# Patient Record
Sex: FEMALE | Race: WHITE | NOT HISPANIC OR LATINO | Employment: OTHER | URBAN - METROPOLITAN AREA
[De-identification: names, ages, dates, MRNs, and addresses within clinical notes are randomized per-mention and may not be internally consistent; named-entity substitution may affect disease eponyms.]

---

## 2017-01-01 ENCOUNTER — TELEPHONE (OUTPATIENT)
Dept: CARDIOLOGY | Facility: CLINIC | Age: 82
End: 2017-01-01

## 2017-09-08 PROBLEM — K92.2 GI BLEEDING: Status: ACTIVE | Noted: 2017-01-01

## 2017-09-08 PROBLEM — K92.2 LOWER GASTROINTESTINAL BLEEDING: Status: ACTIVE | Noted: 2017-01-01

## 2017-10-18 PROBLEM — E86.1 INTRAVASCULAR VOLUME DEPLETION: Status: ACTIVE | Noted: 2017-01-01

## 2017-10-18 PROBLEM — R79.89 ELEVATED TROPONIN: Status: ACTIVE | Noted: 2017-01-01

## 2017-10-18 PROBLEM — E86.0 DEHYDRATION: Status: ACTIVE | Noted: 2017-01-01

## 2017-10-19 PROBLEM — E86.0 DEHYDRATION: Status: ACTIVE | Noted: 2017-01-01

## 2017-10-20 NOTE — TELEPHONE ENCOUNTER
----- Message from [place] sent at 10/20/2017  9:28 AM CDT -----  Contact: Mindy gaspar/[facility]   Mindy is requesting [facility] records on pt   Call Back#[phone]  Thanks